# Patient Record
Sex: MALE | Race: WHITE | NOT HISPANIC OR LATINO | URBAN - METROPOLITAN AREA
[De-identification: names, ages, dates, MRNs, and addresses within clinical notes are randomized per-mention and may not be internally consistent; named-entity substitution may affect disease eponyms.]

---

## 2019-03-11 ENCOUNTER — OFFICE VISIT (OUTPATIENT)
Dept: FAMILY MEDICINE CLINIC | Facility: CLINIC | Age: 24
End: 2019-03-11

## 2019-03-11 VITALS
RESPIRATION RATE: 14 BRPM | HEART RATE: 114 BPM | BODY MASS INDEX: 23.7 KG/M2 | DIASTOLIC BLOOD PRESSURE: 84 MMHG | WEIGHT: 175 LBS | SYSTOLIC BLOOD PRESSURE: 118 MMHG | HEIGHT: 72 IN | OXYGEN SATURATION: 98 %

## 2019-03-11 DIAGNOSIS — F41.9 ANXIETY: Primary | ICD-10-CM

## 2019-03-11 PROCEDURE — 99213 OFFICE O/P EST LOW 20 MIN: CPT | Performed by: PHYSICIAN ASSISTANT

## 2019-03-11 RX ORDER — ESCITALOPRAM OXALATE 10 MG/1
10 TABLET ORAL DAILY
Qty: 30 TABLET | Refills: 11 | Status: SHIPPED | OUTPATIENT
Start: 2019-03-11 | End: 2020-03-24

## 2019-03-11 RX ORDER — ESCITALOPRAM OXALATE 10 MG/1
10 TABLET ORAL DAILY
COMMUNITY
End: 2019-03-11 | Stop reason: SDUPTHER

## 2019-03-11 NOTE — PROGRESS NOTES
Subjective   Marcelino Gonzales is a 23 y.o. male  Establish Care and Anxiety (RF Lexapro 10mg #90)      History of Present Illness  Patient is a pleasant 23-year-old white male who comes in complaining of anxiety needing refill of Lexapro meds working well no problems or complaints no SI/HI concentration is good focus is good meds working well no shortness of breath or chest pain  The following portions of the patient's history were reviewed and updated as appropriate: allergies, current medications, past social history and problem list    Review of Systems   Constitutional: Negative for appetite change, diaphoresis, fatigue and unexpected weight change.   Eyes: Negative for visual disturbance.   Respiratory: Negative for cough, chest tightness and shortness of breath.    Cardiovascular: Negative for chest pain, palpitations and leg swelling.   Gastrointestinal: Negative for diarrhea, nausea and vomiting.   Endocrine: Negative for polydipsia, polyphagia and polyuria.   Skin: Negative for color change and rash.   Neurological: Negative for dizziness, syncope, weakness, light-headedness, numbness and headaches.       Objective     Vitals:    03/11/19 1052   BP: 118/84   Pulse: 114   Resp: 14   SpO2: 98%       Physical Exam   Constitutional: He appears well-developed and well-nourished.   Neck: Neck supple. No JVD present. No thyromegaly present.   Cardiovascular: Normal rate, regular rhythm, normal heart sounds, intact distal pulses and normal pulses.   No murmur heard.  Pulmonary/Chest: Effort normal and breath sounds normal. No respiratory distress.   Abdominal: Soft. Bowel sounds are normal. There is no hepatosplenomegaly. There is no tenderness.   Musculoskeletal: He exhibits no edema.   Lymphadenopathy:     He has no cervical adenopathy.   Neurological: No sensory deficit.   Skin: Skin is warm and dry. He is not diaphoretic.   Nursing note and vitals reviewed.      Assessment/Plan     Diagnoses and all orders for  this visit:    Anxiety    Other orders  -     Discontinue: escitalopram (LEXAPRO) 10 MG tablet; Take 10 mg by mouth Daily.  -     escitalopram (LEXAPRO) 10 MG tablet; Take 1 tablet by mouth Daily.    #1 Lexapro 10 mg 1 p.o. every day dispense 30      Follow up in 3 months

## 2020-03-24 RX ORDER — ESCITALOPRAM OXALATE 10 MG/1
TABLET ORAL
Qty: 30 TABLET | Refills: 0 | Status: SHIPPED | OUTPATIENT
Start: 2020-03-24 | End: 2021-02-18

## 2020-04-27 RX ORDER — ESCITALOPRAM OXALATE 10 MG/1
TABLET ORAL
Qty: 30 TABLET | Refills: 0 | OUTPATIENT
Start: 2020-04-27

## 2021-02-18 ENCOUNTER — OFFICE VISIT (OUTPATIENT)
Dept: FAMILY MEDICINE CLINIC | Facility: CLINIC | Age: 26
End: 2021-02-18

## 2021-02-18 VITALS
OXYGEN SATURATION: 98 % | SYSTOLIC BLOOD PRESSURE: 104 MMHG | WEIGHT: 162.6 LBS | TEMPERATURE: 97.1 F | RESPIRATION RATE: 14 BRPM | HEART RATE: 80 BPM | DIASTOLIC BLOOD PRESSURE: 68 MMHG | HEIGHT: 72 IN | BODY MASS INDEX: 22.02 KG/M2

## 2021-02-18 DIAGNOSIS — F41.1 GAD (GENERALIZED ANXIETY DISORDER): Primary | ICD-10-CM

## 2021-02-18 PROCEDURE — 99213 OFFICE O/P EST LOW 20 MIN: CPT | Performed by: PHYSICIAN ASSISTANT

## 2021-02-18 RX ORDER — ESCITALOPRAM OXALATE 10 MG/1
10 TABLET ORAL DAILY
Qty: 30 TABLET | Refills: 11 | Status: SHIPPED | OUTPATIENT
Start: 2021-02-18 | End: 2022-05-02

## 2021-02-18 RX ORDER — ESCITALOPRAM OXALATE 10 MG/1
10 TABLET ORAL DAILY
Qty: 30 TABLET | Refills: 11 | Status: SHIPPED | OUTPATIENT
Start: 2021-02-18 | End: 2021-02-18 | Stop reason: SDUPTHER

## 2021-02-18 NOTE — PROGRESS NOTES
Subjective   Marcelino Gonzales is a 25 y.o. male  Anxiety      History of Present Illness  -A pleasant 22-year-old white male who presents for anxiety symptoms patient has trouble with focus concentration mood swings patient has trouble with anxiety.  No shortness of breath no chest pain meds working well no SI/HI.  Patient has usual anxiety set  The following portions of the patient's history were reviewed and updated as appropriate: allergies, current medications, past social history and problem list    Review of Systems   Constitutional: Negative for appetite change, diaphoresis, fatigue and unexpected weight change.   Eyes: Negative for visual disturbance.   Respiratory: Negative for cough, chest tightness and shortness of breath.    Cardiovascular: Negative for chest pain, palpitations and leg swelling.   Gastrointestinal: Negative for diarrhea, nausea and vomiting.   Endocrine: Negative for polydipsia, polyphagia and polyuria.   Skin: Negative for color change and rash.   Neurological: Negative for dizziness, syncope, weakness, light-headedness, numbness and headaches.       Objective     Vitals:    02/18/21 1304   BP: 104/68   Pulse: 80   Resp: 14   Temp: 97.1 °F (36.2 °C)   SpO2: 98%       Physical Exam  Vitals signs and nursing note reviewed.   Constitutional:       Appearance: He is well-developed. He is not diaphoretic.   Neck:      Musculoskeletal: Neck supple.      Thyroid: No thyromegaly.      Vascular: No JVD.   Cardiovascular:      Rate and Rhythm: Normal rate and regular rhythm.      Pulses: Normal pulses.      Heart sounds: Normal heart sounds. No murmur.   Pulmonary:      Effort: Pulmonary effort is normal. No respiratory distress.      Breath sounds: Normal breath sounds.   Abdominal:      General: Bowel sounds are normal.      Palpations: Abdomen is soft.      Tenderness: There is no abdominal tenderness.   Lymphadenopathy:      Cervical: No cervical adenopathy.   Skin:     General: Skin is warm  and dry.   Neurological:      Sensory: No sensory deficit.         Assessment/Plan     Diagnoses and all orders for this visit:    1. DANK (generalized anxiety disorder) (Primary)    #1 Lexapro 10 mg 1 p.o. every day dispense 30 x 11 refills    Follow-up as needed recheck in 3 weeks    Follow-up as needed recheck in 3 to 4 weeks

## 2022-01-27 ENCOUNTER — OFFICE VISIT (OUTPATIENT)
Dept: FAMILY MEDICINE CLINIC | Facility: CLINIC | Age: 27
End: 2022-01-27

## 2022-01-27 VITALS
RESPIRATION RATE: 14 BRPM | OXYGEN SATURATION: 98 % | TEMPERATURE: 97.2 F | DIASTOLIC BLOOD PRESSURE: 74 MMHG | WEIGHT: 182 LBS | HEART RATE: 95 BPM | SYSTOLIC BLOOD PRESSURE: 112 MMHG | HEIGHT: 72 IN | BODY MASS INDEX: 24.65 KG/M2

## 2022-01-27 DIAGNOSIS — B08.1 MOLLUSCUM CONTAGIOSUM: Primary | ICD-10-CM

## 2022-01-27 PROCEDURE — 17110 DESTRUCTION B9 LES UP TO 14: CPT | Performed by: PHYSICIAN ASSISTANT

## 2022-01-27 PROCEDURE — 99212 OFFICE O/P EST SF 10 MIN: CPT | Performed by: PHYSICIAN ASSISTANT

## 2022-01-27 NOTE — PROGRESS NOTES
Subjective   Marcelino Gonzales is a 26 y.o. male  Rash      History of Present Illness  Patient is a pleasant 26-year-old white male who comes in planing of rash in suprapubic area patient noticed it a couple weeks ago was seen by dermatologist was told it was molluscum contagiosum patient comes in for second opinion  The following portions of the patient's history were reviewed and updated as appropriate: allergies, current medications, past social history and problem list    Review of Systems   Constitutional: Negative for fatigue and unexpected weight change.   Respiratory: Negative for cough, chest tightness and shortness of breath.    Cardiovascular: Negative for chest pain, palpitations and leg swelling.   Gastrointestinal: Negative for nausea.   Skin: Positive for rash. Negative for color change.   Neurological: Negative for dizziness, syncope, weakness and headaches.       Objective     Vitals:    01/27/22 1630   BP: 112/74   Pulse: 95   Resp: 14   Temp: 97.2 °F (36.2 °C)   SpO2: 98%       Physical Exam  Vitals and nursing note reviewed.   Constitutional:       General: He is not in acute distress.     Appearance: Normal appearance. He is well-developed. He is not ill-appearing, toxic-appearing or diaphoretic.   Neck:      Vascular: No carotid bruit or JVD.   Cardiovascular:      Rate and Rhythm: Normal rate and regular rhythm.      Pulses: Normal pulses.      Heart sounds: Normal heart sounds. No murmur heard.      Pulmonary:      Effort: Pulmonary effort is normal. No respiratory distress.      Breath sounds: Normal breath sounds.   Abdominal:      Palpations: Abdomen is soft.      Tenderness: There is no abdominal tenderness.   Genitourinary:      Skin:     General: Skin is warm and dry.   Neurological:      Mental Status: He is alert.         Assessment/Plan     Diagnoses and all orders for this visit:    1. Molluscum contagiosum (Primary)    Multiple lesions frozen with liquid nitrogen without difficulty  patient tolerated procedure well    I spent 15 minutes in patient care: Reviewing records prior to the visit, examining the patient, entering orders and documentation    Part of this note may be an electronic transcription/translation of spoken language to printed text using the Dragon Dictation System.

## 2022-03-21 ENCOUNTER — OFFICE VISIT (OUTPATIENT)
Dept: FAMILY MEDICINE CLINIC | Facility: CLINIC | Age: 27
End: 2022-03-21

## 2022-03-21 VITALS
RESPIRATION RATE: 14 BRPM | WEIGHT: 182.6 LBS | BODY MASS INDEX: 24.73 KG/M2 | DIASTOLIC BLOOD PRESSURE: 74 MMHG | HEIGHT: 72 IN | HEART RATE: 106 BPM | SYSTOLIC BLOOD PRESSURE: 108 MMHG | OXYGEN SATURATION: 98 % | TEMPERATURE: 97.1 F

## 2022-03-21 DIAGNOSIS — B08.1 MOLLUSCUM CONTAGIOSUM: Primary | ICD-10-CM

## 2022-03-21 PROCEDURE — 99212 OFFICE O/P EST SF 10 MIN: CPT | Performed by: PHYSICIAN ASSISTANT

## 2022-03-21 PROCEDURE — 17111 DESTRUCTION B9 LESIONS 15/>: CPT | Performed by: PHYSICIAN ASSISTANT

## 2022-03-21 NOTE — PROGRESS NOTES
Subjective   Marcelino Gonzales is a 26 y.o. male  Rash      History of Present Illness  Patient is a pleasant 26-year-old white male who comes in complaining of rash in groin area patient states he thinks it could be molluscum contagiosum has had this in the past noticed a couple lesions in suprapubic area  The following portions of the patient's history were reviewed and updated as appropriate: allergies, current medications, past social history and problem list    Review of Systems    Objective     Vitals:    03/21/22 1502   BP: 108/74   Pulse: 106   Resp: 14   Temp: 97.1 °F (36.2 °C)   SpO2: 98%       Physical Exam  Vitals and nursing note reviewed.   Constitutional:       General: He is not in acute distress.     Appearance: Normal appearance. He is well-developed. He is not ill-appearing, toxic-appearing or diaphoretic.   Neck:      Vascular: No carotid bruit or JVD.   Cardiovascular:      Rate and Rhythm: Normal rate and regular rhythm.      Pulses: Normal pulses.      Heart sounds: Normal heart sounds. No murmur heard.  Pulmonary:      Effort: Pulmonary effort is normal. No respiratory distress.      Breath sounds: Normal breath sounds.   Abdominal:      Palpations: Abdomen is soft.      Tenderness: There is no abdominal tenderness.       Skin:     General: Skin is warm and dry.   Neurological:      Mental Status: He is alert.         Assessment/Plan     Diagnoses and all orders for this visit:    1. Molluscum contagiosum (Primary)    #1 lesions frozen with liquid nitrogen without difficulty, patient tolerated procedure well    .I spent 15 minutes in patient care: Reviewing records prior to the visit, examining the patient, entering orders and documentation    Part of this note may be an electronic transcription/translation of spoken language to printed text using the Dragon Dictation System.

## 2022-05-02 RX ORDER — ESCITALOPRAM OXALATE 10 MG/1
TABLET ORAL
Qty: 30 TABLET | Refills: 5 | Status: SHIPPED | OUTPATIENT
Start: 2022-05-02

## 2022-06-09 ENCOUNTER — HOSPITAL ENCOUNTER (OUTPATIENT)
Dept: GENERAL RADIOLOGY | Facility: HOSPITAL | Age: 27
Discharge: HOME | End: 2022-06-09
Admitting: PHYSICIAN ASSISTANT

## 2022-06-09 ENCOUNTER — OFFICE VISIT (OUTPATIENT)
Dept: FAMILY MEDICINE CLINIC | Facility: CLINIC | Age: 27
End: 2022-06-09

## 2022-06-09 VITALS
TEMPERATURE: 97.2 F | HEART RATE: 81 BPM | OXYGEN SATURATION: 99 % | DIASTOLIC BLOOD PRESSURE: 78 MMHG | BODY MASS INDEX: 24.52 KG/M2 | WEIGHT: 181 LBS | HEIGHT: 72 IN | SYSTOLIC BLOOD PRESSURE: 124 MMHG

## 2022-06-09 DIAGNOSIS — M54.2 NECK PAIN ON RIGHT SIDE: ICD-10-CM

## 2022-06-09 DIAGNOSIS — M54.2 NECK PAIN ON RIGHT SIDE: Primary | ICD-10-CM

## 2022-06-09 PROCEDURE — 72050 X-RAY EXAM NECK SPINE 4/5VWS: CPT

## 2022-06-09 PROCEDURE — 99213 OFFICE O/P EST LOW 20 MIN: CPT | Performed by: PHYSICIAN ASSISTANT

## 2022-06-09 RX ORDER — PREDNISONE 20 MG/1
TABLET ORAL
COMMUNITY
Start: 2022-06-07

## 2022-06-09 RX ORDER — METHOCARBAMOL 500 MG/1
TABLET, FILM COATED ORAL
COMMUNITY
Start: 2022-06-07

## 2022-06-09 NOTE — PROGRESS NOTES
Subjective   Marcelino Gonzales is a 27 y.o. male  Neck Pain (Ongoing dull neck pain after football injury on 06/05, hasn't picked up Robaxin or prednisone yet prescribed by )      History of Present Illness   The patient is a 27-year-old male seen today to be seen and evaluated for ongoing neck pain after a football injury on 06/05/2021. He was previously seen by urgent care on approximately 06/07/2022.    The patient reports experiencing dull right neck pain that is made worse by lying supine, after an accident injury playing football on 06/05/2022 where he experienced sharp neck pain that radiated to his shoulders. He notes overall a normal neck range of motion until he attempts to turn laterally to the right or move his head to look up and down with symptoms worsening with head flexion movement.  The patient was seen by urgent care on approximately 06/07/2022 for current symptoms and treated with predniSONE (DELTASONE) 20 MG tablet as directed and methocarbamol (ROBAXIN) 500 MG tablet. He notes that the facility could not perform XR spine at this time, but was advised to have imaging performed if symptoms persisted.  The patient denies experiencing the sharp pain radiating down to his arms, pain with palpation, or current pain symptoms radiating to his shoulders. The patient denies attempting to treat current symptoms with OTC pain relievers, predniSONE (DELTASONE) 20 MG tablet as directed, or methocarbamol (ROBAXIN) 500 MG tablet.    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       The following portions of the patient's history were reviewed and updated as appropriate: allergies, current medications, past social history and problem list    Review of Systems   HENT: Negative for sore throat, trouble swallowing and voice change.    Respiratory: Negative for shortness of breath.    Musculoskeletal: Positive for neck pain and neck stiffness.   Skin: Negative  for rash and wound.   Hematological: Negative for adenopathy.       Objective     Vitals:    06/09/22 1116   BP: 124/78   Pulse: 81   Temp: 97.2 °F (36.2 °C)   SpO2: 99%       Physical Exam  Vitals and nursing note reviewed.   Constitutional:       General: He is not in acute distress.     Appearance: Normal appearance. He is well-developed. He is not ill-appearing, toxic-appearing or diaphoretic.   HENT:      Head: Normocephalic and atraumatic.   Neck:      Thyroid: No thyromegaly.      Vascular: No JVD.      Trachea: No tracheal deviation.     Pulmonary:      Effort: Pulmonary effort is normal. No respiratory distress.      Breath sounds: Normal breath sounds.   Musculoskeletal:      Cervical back: Muscular tenderness present. No spinous process tenderness. Decreased range of motion.   Lymphadenopathy:      Cervical: No cervical adenopathy.   Skin:     General: Skin is warm and dry.   Neurological:      Mental Status: He is alert.      Sensory: No sensory deficit.      Motor: No weakness.      Coordination: Coordination normal.         Assessment & Plan    1. Neck pain on right side  - Orders to perform XR cervical spine placed at this time. Assured patient that the office will contact him with the results of this testing when available within the next 1 day to 2 days.  - In the event the patient's XR reveals anything that looks even the slightest out of place, then we will need to do an MRI of his neck, which would be able to see where the nerve endings are and make sure nothing is getting pinched.  - Cautioned the patient that regardless the source of his neck pain, such as pinched nerve, the treatment would be physical therapy.  - Provided the patient with a school/work excuse for encounter, as well as diagnosis with treatment instruction if applicable at this time.      Diagnoses and all orders for this visit:    1. Neck pain on right side (Primary)  -     XR Spine Cervical Complete 4 or 5 View;  Future          Transcribed from ambient dictation for Shea Armijo PA-C by THOMAS COCHRAN.  06/09/22   11:54 EDT    Patient verbalized consent to the visit recording.

## 2023-05-18 RX ORDER — ESCITALOPRAM OXALATE 10 MG/1
TABLET ORAL
Qty: 30 TABLET | Refills: 0 | OUTPATIENT
Start: 2023-05-18

## 2023-05-24 RX ORDER — ESCITALOPRAM OXALATE 10 MG/1
TABLET ORAL
Qty: 30 TABLET | Refills: 0 | OUTPATIENT
Start: 2023-05-24